# Patient Record
Sex: MALE | Race: WHITE
[De-identification: names, ages, dates, MRNs, and addresses within clinical notes are randomized per-mention and may not be internally consistent; named-entity substitution may affect disease eponyms.]

---

## 2017-08-30 ENCOUNTER — HOSPITAL ENCOUNTER (OUTPATIENT)
Dept: HOSPITAL 45 - C.LABPVFM | Age: 37
Discharge: HOME | End: 2017-08-30
Attending: FAMILY MEDICINE
Payer: COMMERCIAL

## 2017-08-30 DIAGNOSIS — R53.83: ICD-10-CM

## 2017-08-30 DIAGNOSIS — L29.9: ICD-10-CM

## 2017-08-30 DIAGNOSIS — Z00.00: Primary | ICD-10-CM

## 2017-08-30 LAB
ALBUMIN/GLOB SERPL: 0.9 {RATIO} (ref 0.9–2)
ALP SERPL-CCNC: 96 U/L (ref 45–117)
ALT SERPL-CCNC: 33 U/L (ref 12–78)
ANION GAP SERPL CALC-SCNC: 8 MMOL/L (ref 3–11)
AST SERPL-CCNC: 19 U/L (ref 15–37)
BASOPHILS # BLD: 0.04 K/UL (ref 0–0.2)
BASOPHILS NFR BLD: 0.4 %
BUN SERPL-MCNC: 12 MG/DL (ref 7–18)
BUN/CREAT SERPL: 10.5 (ref 10–20)
CALCIUM SERPL-MCNC: 9.5 MG/DL (ref 8.5–10.1)
CHLORIDE SERPL-SCNC: 107 MMOL/L (ref 98–107)
CHOLEST/HDLC SERPL: 6.7 {RATIO}
CO2 SERPL-SCNC: 26 MMOL/L (ref 21–32)
COMPLETE: YES
CREAT SERPL-MCNC: 1.1 MG/DL (ref 0.6–1.4)
EOSINOPHIL NFR BLD AUTO: 295 K/UL (ref 130–400)
GLOBULIN SER-MCNC: 4.2 GM/DL (ref 2.5–4)
GLUCOSE SERPL-MCNC: 87 MG/DL (ref 70–99)
GLUCOSE UR QL: 24 MG/DL
HCT VFR BLD CALC: 44.1 % (ref 42–52)
IG%: 0.3 %
IMM GRANULOCYTES NFR BLD AUTO: 23.9 %
KETONES UR QL STRIP: 95 MG/DL
LYMPHOCYTES # BLD: 2.48 K/UL (ref 1.2–3.4)
MCH RBC QN AUTO: 28.3 PG (ref 25–34)
MCHC RBC AUTO-ENTMCNC: 33.8 G/DL (ref 32–36)
MCV RBC AUTO: 83.7 FL (ref 80–100)
MONOCYTES NFR BLD: 6.2 %
NEUTROPHILS # BLD AUTO: 0.9 %
NEUTROPHILS NFR BLD AUTO: 68.3 %
NITRITE UR QL STRIP: 205 MG/DL (ref 0–150)
PH UR: 160 MG/DL (ref 0–200)
PMV BLD AUTO: 10.4 FL (ref 7.4–10.4)
POTASSIUM SERPL-SCNC: 4.1 MMOL/L (ref 3.5–5.1)
RBC # BLD AUTO: 5.27 M/UL (ref 4.7–6.1)
SODIUM SERPL-SCNC: 141 MMOL/L (ref 136–145)
TSH SERPL-ACNC: 2.74 UIU/ML (ref 0.3–4.5)
VERY LOW DENSITY LIPOPROT CALC: 41 MG/DL
WBC # BLD AUTO: 10.38 K/UL (ref 4.8–10.8)

## 2017-09-02 LAB — TESTOST SERPL-MCNC: 244 NG/DL (ref 250–1100)

## 2017-10-18 ENCOUNTER — HOSPITAL ENCOUNTER (OUTPATIENT)
Dept: HOSPITAL 45 - C.LABPVFM | Age: 37
Discharge: HOME | End: 2017-10-18
Attending: FAMILY MEDICINE
Payer: COMMERCIAL

## 2017-10-18 DIAGNOSIS — R19.7: Primary | ICD-10-CM

## 2018-03-22 ENCOUNTER — HOSPITAL ENCOUNTER (OUTPATIENT)
Dept: HOSPITAL 45 - C.RADPV | Age: 38
Discharge: HOME | End: 2018-03-22
Attending: FAMILY MEDICINE
Payer: COMMERCIAL

## 2018-03-22 DIAGNOSIS — R06.02: Primary | ICD-10-CM

## 2018-03-22 NOTE — DIAGNOSTIC IMAGING REPORT
CHEST 2 VIEWS ROUTINE



CLINICAL HISTORY: Shortness of breath    



COMPARISON STUDY:  No previous studies for comparison.



FINDINGS: The heart is mildly enlarged. There is mild bronchovascular crowding

at the lung bases. There is no focal pulmonary consolidation. There are no

pleural effusions. There is no overt failure.[ 



IMPRESSION: Mild cardiomegaly. No acute findings.







Electronically signed by:  Gallo Briseno M.D.

3/22/2018 9:16 AM



Dictated Date/Time:  3/22/2018 9:16 AM